# Patient Record
Sex: MALE | Race: WHITE | Employment: OTHER | URBAN - METROPOLITAN AREA
[De-identification: names, ages, dates, MRNs, and addresses within clinical notes are randomized per-mention and may not be internally consistent; named-entity substitution may affect disease eponyms.]

---

## 2020-06-24 ENCOUNTER — HOSPITAL ENCOUNTER (EMERGENCY)
Facility: CLINIC | Age: 70
Discharge: HOME | End: 2020-06-24
Attending: EMERGENCY MEDICINE | Admitting: EMERGENCY MEDICINE
Payer: COMMERCIAL

## 2020-06-24 VITALS
SYSTOLIC BLOOD PRESSURE: 151 MMHG | RESPIRATION RATE: 16 BRPM | OXYGEN SATURATION: 95 % | TEMPERATURE: 98.7 F | WEIGHT: 208 LBS | HEART RATE: 76 BPM | DIASTOLIC BLOOD PRESSURE: 86 MMHG

## 2020-06-24 DIAGNOSIS — R33.9 URINARY RETENTION: ICD-10-CM

## 2020-06-24 LAB
ALBUMIN UR-MCNC: NEGATIVE MG/DL
ANION GAP SERPL CALCULATED.3IONS-SCNC: 6 MMOL/L (ref 3–14)
APPEARANCE UR: CLEAR
BASOPHILS # BLD AUTO: 0.1 10E9/L (ref 0–0.2)
BASOPHILS NFR BLD AUTO: 0.7 %
BILIRUB UR QL STRIP: NEGATIVE
BUN SERPL-MCNC: 15 MG/DL (ref 7–30)
CALCIUM SERPL-MCNC: 8.9 MG/DL (ref 8.5–10.1)
CHLORIDE SERPL-SCNC: 109 MMOL/L (ref 94–109)
CO2 SERPL-SCNC: 24 MMOL/L (ref 20–32)
COLOR UR AUTO: ABNORMAL
CREAT SERPL-MCNC: 0.78 MG/DL (ref 0.66–1.25)
DIFFERENTIAL METHOD BLD: NORMAL
EOSINOPHIL # BLD AUTO: 0.2 10E9/L (ref 0–0.7)
EOSINOPHIL NFR BLD AUTO: 1.5 %
ERYTHROCYTE [DISTWIDTH] IN BLOOD BY AUTOMATED COUNT: 13.2 % (ref 10–15)
GFR SERPL CREATININE-BSD FRML MDRD: >90 ML/MIN/{1.73_M2}
GLUCOSE SERPL-MCNC: 122 MG/DL (ref 70–99)
GLUCOSE UR STRIP-MCNC: NEGATIVE MG/DL
HCT VFR BLD AUTO: 47.8 % (ref 40–53)
HGB BLD-MCNC: 15.7 G/DL (ref 13.3–17.7)
HGB UR QL STRIP: ABNORMAL
IMM GRANULOCYTES # BLD: 0 10E9/L (ref 0–0.4)
IMM GRANULOCYTES NFR BLD: 0.2 %
KETONES UR STRIP-MCNC: NEGATIVE MG/DL
LEUKOCYTE ESTERASE UR QL STRIP: NEGATIVE
LYMPHOCYTES # BLD AUTO: 1.2 10E9/L (ref 0.8–5.3)
LYMPHOCYTES NFR BLD AUTO: 11.9 %
MCH RBC QN AUTO: 30 PG (ref 26.5–33)
MCHC RBC AUTO-ENTMCNC: 32.8 G/DL (ref 31.5–36.5)
MCV RBC AUTO: 91 FL (ref 78–100)
MONOCYTES # BLD AUTO: 0.9 10E9/L (ref 0–1.3)
MONOCYTES NFR BLD AUTO: 9.4 %
MUCOUS THREADS #/AREA URNS LPF: PRESENT /LPF
NEUTROPHILS # BLD AUTO: 7.6 10E9/L (ref 1.6–8.3)
NEUTROPHILS NFR BLD AUTO: 76.3 %
NITRATE UR QL: NEGATIVE
NRBC # BLD AUTO: 0 10*3/UL
NRBC BLD AUTO-RTO: 0 /100
PH UR STRIP: 5 PH (ref 5–7)
PLATELET # BLD AUTO: 250 10E9/L (ref 150–450)
POTASSIUM SERPL-SCNC: 3.6 MMOL/L (ref 3.4–5.3)
RBC # BLD AUTO: 5.23 10E12/L (ref 4.4–5.9)
RBC #/AREA URNS AUTO: 1 /HPF (ref 0–2)
SODIUM SERPL-SCNC: 139 MMOL/L (ref 133–144)
SOURCE: ABNORMAL
SP GR UR STRIP: 1.01 (ref 1–1.03)
UROBILINOGEN UR STRIP-MCNC: NORMAL MG/DL (ref 0–2)
WBC # BLD AUTO: 9.9 10E9/L (ref 4–11)
WBC #/AREA URNS AUTO: 1 /HPF (ref 0–5)

## 2020-06-24 PROCEDURE — 99283 EMERGENCY DEPT VISIT LOW MDM: CPT

## 2020-06-24 PROCEDURE — 81001 URINALYSIS AUTO W/SCOPE: CPT | Performed by: EMERGENCY MEDICINE

## 2020-06-24 PROCEDURE — 51798 US URINE CAPACITY MEASURE: CPT

## 2020-06-24 PROCEDURE — 85025 COMPLETE CBC W/AUTO DIFF WBC: CPT | Performed by: EMERGENCY MEDICINE

## 2020-06-24 PROCEDURE — 51702 INSERT TEMP BLADDER CATH: CPT

## 2020-06-24 PROCEDURE — 80048 BASIC METABOLIC PNL TOTAL CA: CPT | Performed by: EMERGENCY MEDICINE

## 2020-06-24 RX ORDER — TAMSULOSIN HYDROCHLORIDE 0.4 MG/1
0.4 CAPSULE ORAL DAILY
Qty: 10 CAPSULE | Refills: 0 | Status: SHIPPED | OUTPATIENT
Start: 2020-06-24 | End: 2020-07-04

## 2020-06-24 ASSESSMENT — ENCOUNTER SYMPTOMS
DIFFICULTY URINATING: 1
HEMATURIA: 0

## 2020-06-24 NOTE — ED AVS SNAPSHOT
[facility] Emergency Department  [address]  [facility] [address]  Phone:  [phone]  Fax:  [phone]                                    Walter Silva   MRN: [id number]    Department:  [facility] Emergency Department   Date of Visit:  6/24/2020           After Visit Summary Signature Page    I have received my discharge instructions, and my questions have been answered. I have discussed any challenges I see with this plan with the nurse or doctor.    ..........................................................................................................................................  Patient/Patient Representative Signature      ..........................................................................................................................................  Patient Representative Print Name and Relationship to Patient    ..................................................               ................................................  Date                                   Time    ..........................................................................................................................................  Reviewed by Signature/Title    ...................................................              ..............................................  Date                                               Time          22EPIC Rev 08/18

## 2020-06-25 ENCOUNTER — NURSE TRIAGE (OUTPATIENT)
Dept: NURSING | Facility: CLINIC | Age: 70
End: 2020-06-25

## 2020-06-25 NOTE — TELEPHONE ENCOUNTER
Catheter placed last night in ER for urinary retention.  Pink discoloration in tube and bag.  Bladder is draining through the tube into the bag.  Also some blood in his underwear this a.m. when he got up.  He asked if the blood could be related to the trauma of the catheter being placed.  It can.  I encouraged he continue to monitor the flow of urine, the blood in the bag and tube and continued spotting of blood in his underwear.  I recommended he carefully clean the tube and his penis of any blood that may be from last nights trauma.  If the bleeding continues for over 24 hours or gets worse I instructed Walter to call us day or night.    I told him too to call if his urine stops flowing through the tube into the bag. Fever or pain would be another reason to call.  His pcp is [facility]. He will call his primary care clinic if questions or concerns arise. I told him too he can always call us 24/7.  Walter stated understanding and agreement.    COVID 19 Nurse Triage Plan/Patient Instructions    Please be aware that novel coronavirus (COVID-19) may be circulating in the community. If you develop symptoms such as fever, cough, or SOB or if you have concerns about the presence of another infection including coronavirus (COVID-19), please contact your health care provider or visit [email or web address].     Disposition/Instructions    Patient to stay at home and follow home care protocol based instructions.     Thank you for taking steps to prevent the spread of this virus.  o Limit your contact with others.  o Wear a simple mask to cover your cough.  o Wash your hands well and often.    Resources    [facility]: About COVID-19: [email or web address]    CDC: What to Do If You're Sick: www.cdc.gov/coronavirus/2019-ncov/about/steps-when-sick.html    CDC: Ending Home Isolation: www.cdc.gov/coronavirus/2019-ncov/hcp/disposition-in-home-patients.html     CDC: Caring for Someone:  www.cdc.gov/coronavirus/2019-ncov/if-you-are-sick/care-for-someone.html     [facility]: Interim Guidance for Hospital Discharge to Home: www.health.[facility].[place]/diseases/coronavirus/hcp/hospdischarge.pdf    [facility] clinical trials (COVID-19 research studies): clinicalaffairs.[facility]/umn-clinical-trials     Below are the COVID-19 hotlines at the [place] Department of [facility] ([facility]). Interpreters are available.   o For health questions: Call 780-526-6798 or 1-894.163.3205 (7 a.m. to 7 p.m.)  o For questions about schools and childcare: Call 276-926-2406 or 1-743.981.9124 (7 a.m. to 7 p.m.)     Additional Information    Negative: Shock suspected (e.g., cold/pale/clammy skin, too weak to stand, low BP, rapid pulse)    Negative: Sounds like a life-threatening emergency to the triager    Negative: Catheter was accidentally pulled-out and bright red continuous bleeding    Negative: SEVERE abdominal pain    Negative: Fever > 100.5 F (38.1 C)    Negative: Drinking very little and dehydration suspected (e.g., no urine > 12 hours, very dry mouth, very lightheaded)    Negative: Patient sounds very sick or weak to the triager    Negative: Catheter was accidentally pulled-out    Negative: Bleeding around catheter (e.g., from penis or female urethra)    Negative: Lower abdominal pain or distention    Negative: Tea-colored or slightly red urine lasts > 24 hours that is not cleared by increasing fluid intake, and no recent prostate or bladder surgery    Negative: Cloudy urine lasts > 24 hours and not cleared by increasing fluid intake    Negative: Bloody or red-colored urine and prostate or bladder surgery > 3 days (72 hours) ago    Negative: No urine in bag for > 4 hours and catheter is not kinked    Negative: Leakage of urine around catheter    Negative: Catheter is broken or cracked and is still usable    Negative: Patient wants to be seen    Negative: Urinary catheter care, questions about    Tea-colored or slightly red  urine, present < 24 hours    Protocols used: URINARY CATHETER SYMPTOMS AND [id number]    Kathy G, RN [facility]

## 2020-06-25 NOTE — DISCHARGE INSTRUCTIONS
Return for fevers, catheter not draining urine, large amount of blood in the catheter bag, worsening abdominal pain, new concerns.  Follow-up with the urology clinic of your choice.

## 2020-06-25 NOTE — ED PROVIDER NOTES
History     Chief Complaint:  Urinary Retention     HPI   Walter Silva is a 70 year old male who presents for evaluation or urinary retention. After drinking two beers while golfing today the patient reports that he had some urinary frequency until around 1600 when he started to have difficulty urinating with urgency. Since then he has only been able to produce a small amount of urine at a time. Due to his persistent urinary retention tonight, he came into the ED for evaluation. He has not had any recent hematuria or dysuria or fever.  Patient endorses a history of what sounds like prostatic hypertrophy and underwent biopsies about 10 years ago that he says were negative for malignancy.    Allergies:  Brimonidine  Dorzolamide  Latanoprost   Timolol      Medications:    Lipitor   Aspirin 81 mg      Past Medical History:    The patient denies any relevant past medical history.     Past Surgical History:    Repair laceration cornea globe, open globe, prolapsed iris     Family History:    History reviewed. No pertinent family history.     Social History:  Alcohol use:    Positive   Accompanied to ED by:  Alone      Review of Systems   Genitourinary: Positive for difficulty urinating and urgency. Negative for hematuria.   All other systems reviewed and are negative.    Physical Exam   First Vitals:  BP: (!) 176/108  Pulse: 94  Heart Rate: 94  Temp: 98.7  F (37.1  C)  Resp: 16  Weight: 94.3 kg (208 lb)  SpO2: 95 %      Physical Exam  VS: Reviewed per above  HENT: Mucous membranes moist  EYES: sclera anicteric  CV: Rate as noted, regular rhythm.   RESP: Effort normal.   GI: no tenderness/rebound/guarding, not distended.  : Maher catheter in place, draining jayden urine in the Maher catheter bag  NEURO: Alert, moving all extremities  MSK: No deformity of the extremities  SKIN: Warm and dry    Emergency Department Course     Laboratory:  CBC: WNL (WBC 9.9, HGB 15.7, )   BMP: Glucose 122 high, o/w WNL (Creatinine  0.78)   UA with Microscopic: Small blood, Mucous present, o/w Negative      Emergency Department Course:  Nursing notes and vitals reviewed.  2249: I performed an exam of the patient as documented above.     I personally reviewed the laboratory results with the patient and answered all related questions prior to discharge.     Findings and plan explained to the Patient. Patient discharged home with instructions regarding supportive care, medications, and reasons to return. The importance of close follow-up was reviewed. The patient was prescribed Flomax.     Impression & Plan       Medical Decision Making:  Patient presents to the ER for evaluation of urinary retention.  On arrival vital signs within normal limits.  During my assessment, patient was already noted to have over 800 cc of urine on bedside ultrasound and underwent Maher catheter placement.  He was feeling much better at this time.  Abdomen soft nontender without peritoneal signs.  No evidence of UTI or acute kidney injury.  Serial blood pressures did not suggest post bladder decompression hypotension.  Discussed side effects of Flomax as well as plan to discharge with prescription for Flomax.  Discussed follow-up with urology after the weekend for trial of void.  I suspect retention is secondary to underlying prostatic hypertrophy.  Close return precautions were discussed prior to discharge.       Diagnosis:    ICD-10-CM   1. Urinary retention  R33.9      Disposition:  Discharged to home with Flomax.      Discharge Medications:  New Prescriptions    TAMSULOSIN (FLOMAX) 0.4 MG CAPSULE    Take 1 capsule (0.4 mg) by mouth daily for 10 doses         IKapil, am serving as a scribe at 10:52 PM on 6/24/2020 to document services personally performed by Henrry Martinez MD, based on my observations and the provider's statements to me.     [facility] EMERGENCY DEPARTMENT       Henrry Martinez MD  06/25/20 0011

## 2020-06-27 ENCOUNTER — NURSE TRIAGE (OUTPATIENT)
Dept: NURSING | Facility: CLINIC | Age: 70
End: 2020-06-27

## 2020-06-27 NOTE — TELEPHONE ENCOUNTER
Caller still having tea colored urine  After 72 hrs after  catheter inserted for urinary retention; No bright red blood and catheter is draining well   Has an occasional  bladder spasm where he feels urge to void; notes some leakage from penis at that time  Caller has an appointment with urology for  36 hrs .    Triage protocol reviewed   advised in home care and nino be seen in ED if he developes  Increased bleeding pain or fever   Futher advised to increase and intake and record I&O;   Advised to secure catheter to leg so that  Catheter has slack but is not being pulled or  estela with movement    Advise to keep scheduled appointment with urology      Caller understands and will comply    Bella Borrero RN  FNA       Reason for Disposition    [1] Tea-colored or slightly red urine lasts > 24 hours AND [2] not cleared by increasing fluid intake    AND [3] no recent prostate or bladder surgery    Additional Information    Negative: Shock suspected (e.g., cold/pale/clammy skin, too weak to stand, low BP, rapid pulse)    Negative: Sounds like a life-threatening emergency to the triager    Negative: [1] Catheter was accidentally pulled-out AND [2] bright red continuous bleeding    Negative: SEVERE abdominal pain    Negative: Fever > 100.5 F (38.1 C)    Negative: [1] Drinking very little AND [2] dehydration suspected (e.g., no urine > 12 hours, very dry mouth, very lightheaded)    Negative: Patient sounds very sick or weak to the triager    Negative: Catheter was accidentally pulled-out    Negative: [1] Catheter is broken AND [2] is not usable    Negative: Bleeding around catheter (e.g., from penis or female urethra)    Negative: Lower abdominal pain or distention    Negative: [1] No urine in bag > 4 hours AND [2] catheter is not kinked    Protocols used: URINARY CATHETER SYMPTOMS AND [id number]

## 2024-12-10 ENCOUNTER — HOSPITAL ENCOUNTER (EMERGENCY)
Facility: CLINIC | Age: 74
Discharge: HOME | End: 2024-12-10
Attending: EMERGENCY MEDICINE | Admitting: EMERGENCY MEDICINE
Payer: COMMERCIAL

## 2024-12-10 VITALS
DIASTOLIC BLOOD PRESSURE: 84 MMHG | SYSTOLIC BLOOD PRESSURE: 144 MMHG | BODY MASS INDEX: 30.12 KG/M2 | OXYGEN SATURATION: 95 % | WEIGHT: 215.17 LBS | HEIGHT: 71 IN | RESPIRATION RATE: 18 BRPM | HEART RATE: 80 BPM | TEMPERATURE: 97.7 F

## 2024-12-10 DIAGNOSIS — R33.8 ACUTE URINARY RETENTION: ICD-10-CM

## 2024-12-10 DIAGNOSIS — Z98.890 H/O PROSTATE BIOPSY: ICD-10-CM

## 2024-12-10 PROCEDURE — 51702 INSERT TEMP BLADDER CATH: CPT

## 2024-12-10 PROCEDURE — 99284 EMERGENCY DEPT VISIT MOD MDM: CPT | Mod: 59

## 2024-12-10 PROCEDURE — 51798 US URINE CAPACITY MEASURE: CPT

## 2024-12-10 ASSESSMENT — COLUMBIA-SUICIDE SEVERITY RATING SCALE - C-SSRS
1. IN THE PAST MONTH, HAVE YOU WISHED YOU WERE DEAD OR WISHED YOU COULD GO TO SLEEP AND NOT WAKE UP?: NO
2. HAVE YOU ACTUALLY HAD ANY THOUGHTS OF KILLING YOURSELF IN THE PAST MONTH?: NO
6. HAVE YOU EVER DONE ANYTHING, STARTED TO DO ANYTHING, OR PREPARED TO DO ANYTHING TO END YOUR LIFE?: NO

## 2024-12-10 ASSESSMENT — ACTIVITIES OF DAILY LIVING (ADL): ADLS_ACUITY_SCORE: 41

## 2024-12-10 NOTE — ED TRIAGE NOTES
Patient states he had a prostate biopsy today. Patient states he has been unable to urinate since the procedure.       Triage Assessment (Adult)       Row Name 12/10/24 5542          Triage Assessment    Airway WDL WDL        Respiratory WDL    Respiratory WDL WDL        Skin Circulation/Temperature WDL    Skin Circulation/Temperature WDL WDL        Cardiac WDL    Cardiac WDL WDL        Peripheral/Neurovascular WDL    Peripheral Neurovascular WDL WDL        Cognitive/Neuro/Behavioral WDL    Cognitive/Neuro/Behavioral WDL WDL

## 2024-12-11 NOTE — ED PROVIDER NOTES
Emergency Department Note      History of Present Illness     Chief Complaint   Urinary Retention      HPI   Walter Silva is a 74 year old male who presents emergency department with inability urinate after prostate biopsy today.  He notes generalized lower abdominal discomfort building throughout the afternoon as he has been unable to urinate and occasional sharp shooting pains into the bilateral flanks.  He denies fever or chills.  He denies vomiting.  He notes the prostate biopsy was earlier today and he did urinate a small amount prior to leaving the urologist office.  He has a history of prostrate hypertrophy and has elevating PSA and that is why the biopsy was performed.    Independent Historian   None    Review of External Notes   None    Past Medical History     Medical History and Problem List   Vitreous hemorrhage, right eye 10/30/2023     Atrial fibrillation with RVR 10/04/2022     S/P CABG x 3 09/30/2022     Overview (09/30/2022):    Formatting of this note might be different from the original.  LIMA TO LAD, SVG TO RAMUS, LEFT RADIAL TO OM ON 9/30/22 WITH DR. SHERIFF   NSTEMI (non-ST elevated myocardial infarction) 09/29/2022     Pulmonary nodules 09/29/2022     Witnessed seizure-like activity 09/25/2022     Elevated troponin 09/25/2022     BPH (benign prostatic hyperplasia) 09/25/2022     Hyperlipidemia, unspecified 09/09/2020     Perianal venous thrombosis 11/16/2019     GERD without esophagitis 09/12/2016     Overweight 09/12/2016     Obstructive sleep apnea (adult) (pediatric) 07/16/2014     Pure hypercholesterolemia 06/19/2014     Atherosclerotic heart disease of native coronary artery without angina pectoris 08/20/2011         Medications   dorzolamide-timolol (COSOPT) 2-0.5 % ophthalmic solution  Place 1 Drop into both eyes two times daily.       12/28/2017   Active   travoprost 0.004% (TRAVATAN Z) 0.004 % ophthalmic solution  Place 1 Drop into left eye at bedtime.       10/23/2017   Active    multivitamin folic acid 0.4 mg  Take 1 Tablet by mouth every morning.           Active   coenzyme q10 100 mg cap  Take 100 mg by mouth every morning.           Active   glucosamine-chondroitin, 500-400 mg, (COSAMIN /400) 500-400 mg cap  Take 1 Capsule by mouth every morning.           Active   acetaminophen (TYLENOL EXTRA STRGTH) 500 mg tablet   Indications: S/P CABG x 3 Take 2 Tablets (1,000 mg) by mouth every 6 hours if needed for Pain. Max acetaminophen dose: 4000mg in 24 hrs.   0   10/06/2022   Active   aspirin chewable 81 mg chewable tablet   Indications: S/P CABG x 3 Take 1 Tablet (81 mg) by mouth or nasogastric tube once daily.   0   10/07/2022   Active   levETIRAcetam (KEPPRA) 500 mg tablet   Indications: Seizure-like activity (HC) Take 1 Tablet (500 mg) by mouth two times daily. 60 Tablet    10/06/2022 12:25 PM CDT 10/06/2022   Active   nitroglycerin (NITROSTAT) 0.4 mg sublingual tablet   Indications: S/P CABG x 3 Place 1 Tablet (0.4 mg) under the tongue every 5 minutes if needed for Chest Pain. 25 Tablet  3   11/14/2022   Active   tamsulosin (FLOMAX) 0.4 mg capsule  Takes one capsule daily.   0   04/10/2023   Active   sildenafil citrate (VIAGRA) 50 mg tablet   Indications: Erectile dysfunction of organic origin Take 1 Tablet (50 mg) by mouth once daily if needed for Erectile Dysfunction. Take 30min to 4 hours before sexual activity. Max 100mg/24hr. DO NOT USE WITHIN 24 HOURS OF USING NITROGLYCERIN 30 Tablet  5   11/30/2023   Active   atorvastatin (LIPITOR) 80 mg tablet   Indications: S/P CABG x 3 Take 1 Tablet (80 mg) by mouth once daily with evening meal. 90 Tablet  3   12/02/2024   Active   metoprolol tartrate (LOPRESSOR) 25 mg tablet   Indications: S/P CABG x 3 Take 1 Tablet (25 mg) by mouth two times daily. 180 Tablet  3   12/02/2024   Active       Surgical History   No past surgical history on file.    Physical Exam     Patient Vitals for the past 24 hrs:   BP Temp Temp src Pulse Resp SpO2  "Height Weight   12/10/24 2031 (!) 144/84 97.7  F (36.5  C) Oral -- 18 95 % -- --   12/10/24 1701 (!) 171/104 98.1  F (36.7  C) Oral 80 18 96 % 1.803 m (5' 11\") 97.6 kg (215 lb 2.7 oz)     Physical Exam  General: Elderly adult sitting upright  GI: Abdomen is soft nondistended.  Mild suprapubic tenderness to palpation.  No CVA tenderness to percussion.  MSK: Normal active range of motion.  Skin: Warm and dry.   Neuro: Alert and oriented. Responds appropriately to all questions and commands.   Psych: Normal mood and affect. Pleasant.       ED Course      Medications Administered   None    Discussion of Management   None    ED Course   Past medical records, nursing notes, and vitals reviewed.  I performed an exam of the patient and obtained history, as documented above.  I reassessed the patient after Puga catheter placement.  He notes he is feeling improved.  He feels comfortable to plan for discharge home.  Puga care discussed with him.    Additional Documentation  None    Medical Decision Making / Diagnosis       VANDANA   Walter Silva is a 74 year old male who presents for evaluation of abdominal pain and decreased urinary output after prostate biopsy earlier today.  The history and exam are consistent with acute urinary retention and this is confirmed after puga catheter placement, symptoms resolved after placement.  No indication for urinalysis given short duration of symptoms.  No indication for laboratory work for the same reason.  Will send home with catheter and have patient followup with urology by phone tomorrow to discuss reassessment and Puga removal.  Precautions discussed.  All questions answered prior to discharge.    Disposition   The patient was discharged.     Diagnosis     ICD-10-CM    1. Acute urinary retention  R33.8       2. H/O prostate biopsy  Z98.890            MD Lex Mercer Tracy Dianne, MD  12/10/24 8204    "

## 2025-06-05 ENCOUNTER — APPOINTMENT (OUTPATIENT)
Age: 75
Setting detail: DERMATOLOGY
End: 2025-06-05

## 2025-06-05 DIAGNOSIS — Z71.89 OTHER SPECIFIED COUNSELING: ICD-10-CM

## 2025-06-05 DIAGNOSIS — D22 MELANOCYTIC NEVI: ICD-10-CM

## 2025-06-05 DIAGNOSIS — L30.0 NUMMULAR DERMATITIS: ICD-10-CM

## 2025-06-05 DIAGNOSIS — D18.0 HEMANGIOMA: ICD-10-CM

## 2025-06-05 DIAGNOSIS — L57.0 ACTINIC KERATOSIS: ICD-10-CM

## 2025-06-05 DIAGNOSIS — L82.1 OTHER SEBORRHEIC KERATOSIS: ICD-10-CM

## 2025-06-05 DIAGNOSIS — B07.8 OTHER VIRAL WARTS: ICD-10-CM

## 2025-06-05 PROBLEM — D18.01 HEMANGIOMA OF SKIN AND SUBCUTANEOUS TISSUE: Status: ACTIVE | Noted: 2025-06-05

## 2025-06-05 PROBLEM — D22.5 MELANOCYTIC NEVI OF TRUNK: Status: ACTIVE | Noted: 2025-06-05

## 2025-06-05 PROCEDURE — ? SUNSCREEN RECOMMENDATIONS

## 2025-06-05 PROCEDURE — ? LIQUID NITROGEN

## 2025-06-05 PROCEDURE — ? PRESCRIPTION MEDICATION MANAGEMENT

## 2025-06-05 PROCEDURE — ? PRESCRIPTION

## 2025-06-05 PROCEDURE — ? COUNSELING

## 2025-06-05 RX ORDER — BETAMETHASONE DIPROPIONATE 0.5 MG/G
CREAM, AUGMENTED TOPICAL
Qty: 50 | Refills: 3 | Status: ERX | COMMUNITY
Start: 2025-06-05

## 2025-06-05 RX ADMIN — BETAMETHASONE DIPROPIONATE: 0.5 CREAM, AUGMENTED TOPICAL at 00:00

## 2025-06-05 ASSESSMENT — LOCATION DETAILED DESCRIPTION DERM
LOCATION DETAILED: EPIGASTRIC SKIN
LOCATION DETAILED: RIGHT INFERIOR MEDIAL MALAR CHEEK
LOCATION DETAILED: RIGHT SUPERIOR CENTRAL MALAR CHEEK
LOCATION DETAILED: MIDDLE STERNUM
LOCATION DETAILED: RIGHT PROXIMAL RADIAL DORSAL FOREARM
LOCATION DETAILED: RIGHT SUPERIOR MEDIAL MIDBACK
LOCATION DETAILED: LEFT LATERAL DISTAL PRETIBIAL REGION
LOCATION DETAILED: RIGHT CENTRAL BUCCAL CHEEK
LOCATION DETAILED: INFERIOR THORACIC SPINE
LOCATION DETAILED: SUPERIOR THORACIC SPINE
LOCATION DETAILED: LEFT INFERIOR MEDIAL FOREHEAD

## 2025-06-05 ASSESSMENT — LOCATION SIMPLE DESCRIPTION DERM
LOCATION SIMPLE: LEFT PRETIBIAL REGION
LOCATION SIMPLE: LEFT FOREHEAD
LOCATION SIMPLE: CHEST
LOCATION SIMPLE: RIGHT LOWER BACK
LOCATION SIMPLE: RIGHT CHEEK
LOCATION SIMPLE: UPPER BACK
LOCATION SIMPLE: ABDOMEN
LOCATION SIMPLE: RIGHT FOREARM

## 2025-06-05 ASSESSMENT — LOCATION ZONE DERM
LOCATION ZONE: TRUNK
LOCATION ZONE: LEG
LOCATION ZONE: FACE
LOCATION ZONE: ARM

## 2025-06-05 NOTE — PROCEDURE: LIQUID NITROGEN
Consent: The patient's consent was obtained including but not limited to risks of crusting, scabbing, blistering, scarring, darker or lighter pigmentary change, recurrence, incomplete removal and infection.
Show Aperture Variable?: Yes
Detail Level: Detailed
Render Note In Bullet Format When Appropriate: No
Duration Of Freeze Thaw-Cycle (Seconds): 8
Post-Care Instructions: I reviewed with the patient in detail post-care instructions. Patient is to wear sunprotection, and avoid picking at any of the treated lesions. Pt may apply Vaseline to crusted or scabbing areas.
Medical Necessity Information: It is in your best interest to select a reason for this procedure from the list below. All of these items fulfill various CMS LCD requirements except the new and changing color options.
Spray Paint Text: The liquid nitrogen was applied to the skin utilizing a spray paint frosting technique.
Medical Necessity Clause: This procedure was medically necessary because the lesions that were treated were:

## 2025-06-05 NOTE — HPI: NON-MELANOMA SKIN CANCER F/U (HISTORY OF NMSC)
How Many Skin Cancers Have You Had?: one
What Is The Reason For Today's Visit?: History of Non-Melanoma Skin Cancer
When Was Your Last Cancer Diagnosed?: 2019
Additional History: Basal cell carcinoma on the right helical rim at 9:00

## 2025-06-05 NOTE — PROCEDURE: PRESCRIPTION MEDICATION MANAGEMENT
Detail Level: Zone
Render In Strict Bullet Format?: No
Initiate Treatment: Betamethasone, augmented 0.05% cream twice daily for 1-2 weeks until clear then as needed for flares. After clear, use moisturizer at least once daily to the affected area

## 2025-06-05 NOTE — HPI: SKIN LESION (ACTINIC KERATOSES)
Is This A New Presentation, Or A Follow-Up?: Follow Up Actinic Keratoses
Additional History: Treated the bilateral temples at the last visit

## 2025-06-05 NOTE — HPI: RASH
What Type Of Note Output Would You Prefer (Optional)?: Bullet Format
How Severe Is Your Rash?: mild
Is This A New Presentation, Or A Follow-Up?: Rash
Additional History: Patient reports was seen for eczema in 2023 and was prescribed augmented Betamethasone 0.05% cream twice daily as needed for flares. Patient reports using the cream twice daily for current flare on the left ankle area that started around 2 weeks ago.